# Patient Record
(demographics unavailable — no encounter records)

---

## 2024-12-20 NOTE — HISTORY OF PRESENT ILLNESS
Problem: Falls - Risk of 
Goal: *Absence of Falls Document Joana Steen Fall Risk and appropriate interventions in the flowsheet. Outcome: Progressing Towards Goal 
Fall Risk Interventions: 
Mobility Interventions: PT Consult for mobility concerns Medication Interventions: Patient to call before getting OOB Elimination Interventions: Call light in reach History of Falls Interventions: Door open when patient unattended [Home] : at home, [unfilled] , at the time of the visit. [Other Location: e.g. Home (Enter Location, City,State)___] : at [unfilled] [Verbal consent obtained from patient] : the patient, [unfilled] [de-identified] : 25 yo female here for follow up.   Pt with history of anxiety has been taking escitalopram 10mg for many years now, and tolerating it well. Pt is feeling well on current mediation without any complications. Recently saw NP and rx was filled.  Pt will also be traveling to [facility] to [facility]. Reports up to date with Hep A/B. Needs typhoid vaccination. Also needs medication for preventing altitude illness. Hike will be total of 7 days long.